# Patient Record
Sex: FEMALE | Race: WHITE | NOT HISPANIC OR LATINO
[De-identification: names, ages, dates, MRNs, and addresses within clinical notes are randomized per-mention and may not be internally consistent; named-entity substitution may affect disease eponyms.]

---

## 2017-07-20 ENCOUNTER — APPOINTMENT (OUTPATIENT)
Dept: HUMAN REPRODUCTION | Facility: CLINIC | Age: 22
End: 2017-07-20

## 2017-07-20 DIAGNOSIS — E28.2 POLYCYSTIC OVARIAN SYNDROME: ICD-10-CM

## 2017-07-22 RX ORDER — METFORMIN ER 500 MG 500 MG/1
500 TABLET ORAL
Qty: 90 | Refills: 6 | Status: ACTIVE | COMMUNITY
Start: 2017-07-20 | End: 1900-01-01

## 2017-11-24 ENCOUNTER — RX RENEWAL (OUTPATIENT)
Age: 22
End: 2017-11-24

## 2017-12-04 ENCOUNTER — RX RENEWAL (OUTPATIENT)
Age: 22
End: 2017-12-04

## 2017-12-05 ENCOUNTER — RX RENEWAL (OUTPATIENT)
Age: 22
End: 2017-12-05

## 2017-12-05 RX ORDER — METFORMIN ER 500 MG 500 MG/1
500 TABLET ORAL DAILY
Qty: 90 | Refills: 2 | Status: ACTIVE | COMMUNITY
Start: 2017-12-05 | End: 1900-01-01

## 2018-03-08 ENCOUNTER — EMERGENCY (EMERGENCY)
Facility: HOSPITAL | Age: 23
LOS: 1 days | Discharge: HOME | End: 2018-03-08
Attending: EMERGENCY MEDICINE | Admitting: EMERGENCY MEDICINE
Payer: COMMERCIAL

## 2018-03-08 VITALS
WEIGHT: 166.89 LBS | HEART RATE: 110 BPM | SYSTOLIC BLOOD PRESSURE: 136 MMHG | HEIGHT: 65 IN | TEMPERATURE: 98 F | DIASTOLIC BLOOD PRESSURE: 93 MMHG | RESPIRATION RATE: 18 BRPM | OXYGEN SATURATION: 99 %

## 2018-03-08 VITALS
TEMPERATURE: 98 F | SYSTOLIC BLOOD PRESSURE: 126 MMHG | HEART RATE: 88 BPM | DIASTOLIC BLOOD PRESSURE: 79 MMHG | OXYGEN SATURATION: 98 % | RESPIRATION RATE: 18 BRPM

## 2018-03-08 LAB
ALBUMIN SERPL ELPH-MCNC: 4.3 G/DL — SIGNIFICANT CHANGE UP (ref 3.3–5.2)
ALP SERPL-CCNC: 31 U/L — LOW (ref 40–120)
ALT FLD-CCNC: 15 U/L — SIGNIFICANT CHANGE UP
ANION GAP SERPL CALC-SCNC: 15 MMOL/L — SIGNIFICANT CHANGE UP (ref 5–17)
APPEARANCE UR: CLEAR — SIGNIFICANT CHANGE UP
AST SERPL-CCNC: 16 U/L — SIGNIFICANT CHANGE UP
BASOPHILS # BLD AUTO: 0 K/UL — SIGNIFICANT CHANGE UP (ref 0–0.2)
BILIRUB SERPL-MCNC: 0.2 MG/DL — LOW (ref 0.4–2)
BILIRUB UR-MCNC: NEGATIVE — SIGNIFICANT CHANGE UP
BUN SERPL-MCNC: 4 MG/DL — LOW (ref 8–20)
CALCIUM SERPL-MCNC: 9.1 MG/DL — SIGNIFICANT CHANGE UP (ref 8.6–10.2)
CHLORIDE SERPL-SCNC: 100 MMOL/L — SIGNIFICANT CHANGE UP (ref 98–107)
CO2 SERPL-SCNC: 23 MMOL/L — SIGNIFICANT CHANGE UP (ref 22–29)
COLOR SPEC: YELLOW — SIGNIFICANT CHANGE UP
CREAT SERPL-MCNC: 0.64 MG/DL — SIGNIFICANT CHANGE UP (ref 0.5–1.3)
D DIMER BLD IA.RAPID-MCNC: 437 NG/ML DDU — HIGH
DIFF PNL FLD: ABNORMAL
EOSINOPHIL # BLD AUTO: 0 K/UL — SIGNIFICANT CHANGE UP (ref 0–0.5)
EOSINOPHIL NFR BLD AUTO: 1 % — SIGNIFICANT CHANGE UP (ref 0–5)
EPI CELLS # UR: SIGNIFICANT CHANGE UP
FLUAV H1 2009 PAND RNA SPEC QL NAA+PROBE: DETECTED
GLUCOSE SERPL-MCNC: 103 MG/DL — SIGNIFICANT CHANGE UP (ref 70–115)
GLUCOSE UR QL: NEGATIVE MG/DL — SIGNIFICANT CHANGE UP
HCG UR QL: NEGATIVE — SIGNIFICANT CHANGE UP
HCT VFR BLD CALC: 41.5 % — SIGNIFICANT CHANGE UP (ref 37–47)
HGB BLD-MCNC: 13.7 G/DL — SIGNIFICANT CHANGE UP (ref 12–16)
KETONES UR-MCNC: NEGATIVE — SIGNIFICANT CHANGE UP
LEUKOCYTE ESTERASE UR-ACNC: NEGATIVE — SIGNIFICANT CHANGE UP
LYMPHOCYTES # BLD AUTO: 0.9 K/UL — LOW (ref 1–4.8)
LYMPHOCYTES # BLD AUTO: 20 % — SIGNIFICANT CHANGE UP (ref 20–55)
MCHC RBC-ENTMCNC: 28.9 PG — SIGNIFICANT CHANGE UP (ref 27–31)
MCHC RBC-ENTMCNC: 33 G/DL — SIGNIFICANT CHANGE UP (ref 32–36)
MCV RBC AUTO: 87.6 FL — SIGNIFICANT CHANGE UP (ref 81–99)
MONOCYTES # BLD AUTO: 0.8 K/UL — SIGNIFICANT CHANGE UP (ref 0–0.8)
MONOCYTES NFR BLD AUTO: 19 % — HIGH (ref 3–10)
NEUTROPHILS # BLD AUTO: 2.3 K/UL — SIGNIFICANT CHANGE UP (ref 1.8–8)
NEUTROPHILS NFR BLD AUTO: 59 % — SIGNIFICANT CHANGE UP (ref 37–73)
NITRITE UR-MCNC: NEGATIVE — SIGNIFICANT CHANGE UP
PH UR: 6 — SIGNIFICANT CHANGE UP (ref 5–8)
PLAT MORPH BLD: NORMAL — SIGNIFICANT CHANGE UP
PLATELET # BLD AUTO: 187 K/UL — SIGNIFICANT CHANGE UP (ref 150–400)
POTASSIUM SERPL-MCNC: 3.7 MMOL/L — SIGNIFICANT CHANGE UP (ref 3.5–5.3)
POTASSIUM SERPL-SCNC: 3.7 MMOL/L — SIGNIFICANT CHANGE UP (ref 3.5–5.3)
PROT SERPL-MCNC: 7.9 G/DL — SIGNIFICANT CHANGE UP (ref 6.6–8.7)
PROT UR-MCNC: 15 MG/DL
RAPID RVP RESULT: DETECTED
RBC # BLD: 4.74 M/UL — SIGNIFICANT CHANGE UP (ref 4.4–5.2)
RBC # FLD: 13.1 % — SIGNIFICANT CHANGE UP (ref 11–15.6)
RBC BLD AUTO: NORMAL — SIGNIFICANT CHANGE UP
RBC CASTS # UR COMP ASSIST: ABNORMAL /HPF (ref 0–4)
SODIUM SERPL-SCNC: 138 MMOL/L — SIGNIFICANT CHANGE UP (ref 135–145)
SP GR SPEC: 1.01 — SIGNIFICANT CHANGE UP (ref 1.01–1.02)
UROBILINOGEN FLD QL: NEGATIVE MG/DL — SIGNIFICANT CHANGE UP
VARIANT LYMPHS # BLD: 1 % — SIGNIFICANT CHANGE UP (ref 0–6)
WBC # BLD: 4 K/UL — LOW (ref 4.8–10.8)
WBC # FLD AUTO: 4 K/UL — LOW (ref 4.8–10.8)
WBC UR QL: SIGNIFICANT CHANGE UP

## 2018-03-08 PROCEDURE — 85027 COMPLETE CBC AUTOMATED: CPT

## 2018-03-08 PROCEDURE — 85379 FIBRIN DEGRADATION QUANT: CPT

## 2018-03-08 PROCEDURE — 36415 COLL VENOUS BLD VENIPUNCTURE: CPT

## 2018-03-08 PROCEDURE — 71275 CT ANGIOGRAPHY CHEST: CPT | Mod: 26

## 2018-03-08 PROCEDURE — 96374 THER/PROPH/DIAG INJ IV PUSH: CPT | Mod: XU

## 2018-03-08 PROCEDURE — 96375 TX/PRO/DX INJ NEW DRUG ADDON: CPT

## 2018-03-08 PROCEDURE — 71046 X-RAY EXAM CHEST 2 VIEWS: CPT | Mod: 26

## 2018-03-08 PROCEDURE — 80053 COMPREHEN METABOLIC PANEL: CPT

## 2018-03-08 PROCEDURE — 71046 X-RAY EXAM CHEST 2 VIEWS: CPT

## 2018-03-08 PROCEDURE — 87486 CHLMYD PNEUM DNA AMP PROBE: CPT

## 2018-03-08 PROCEDURE — 81025 URINE PREGNANCY TEST: CPT

## 2018-03-08 PROCEDURE — 99284 EMERGENCY DEPT VISIT MOD MDM: CPT | Mod: 25

## 2018-03-08 PROCEDURE — 87633 RESP VIRUS 12-25 TARGETS: CPT

## 2018-03-08 PROCEDURE — 99284 EMERGENCY DEPT VISIT MOD MDM: CPT

## 2018-03-08 PROCEDURE — 71275 CT ANGIOGRAPHY CHEST: CPT

## 2018-03-08 PROCEDURE — 87581 M.PNEUMON DNA AMP PROBE: CPT

## 2018-03-08 PROCEDURE — 93005 ELECTROCARDIOGRAM TRACING: CPT

## 2018-03-08 PROCEDURE — 87798 DETECT AGENT NOS DNA AMP: CPT

## 2018-03-08 PROCEDURE — 81001 URINALYSIS AUTO W/SCOPE: CPT

## 2018-03-08 PROCEDURE — 93010 ELECTROCARDIOGRAM REPORT: CPT

## 2018-03-08 RX ORDER — DIPHENHYDRAMINE HCL 50 MG
50 CAPSULE ORAL ONCE
Qty: 0 | Refills: 0 | Status: COMPLETED | OUTPATIENT
Start: 2018-03-08 | End: 2018-03-08

## 2018-03-08 RX ORDER — ACETAMINOPHEN 500 MG
975 TABLET ORAL ONCE
Qty: 0 | Refills: 0 | Status: COMPLETED | OUTPATIENT
Start: 2018-03-08 | End: 2018-03-08

## 2018-03-08 RX ORDER — SODIUM CHLORIDE 9 MG/ML
1000 INJECTION INTRAMUSCULAR; INTRAVENOUS; SUBCUTANEOUS
Qty: 0 | Refills: 0 | Status: DISCONTINUED | OUTPATIENT
Start: 2018-03-08 | End: 2018-03-12

## 2018-03-08 RX ORDER — SODIUM CHLORIDE 9 MG/ML
1000 INJECTION INTRAMUSCULAR; INTRAVENOUS; SUBCUTANEOUS ONCE
Qty: 0 | Refills: 0 | Status: COMPLETED | OUTPATIENT
Start: 2018-03-08 | End: 2018-03-08

## 2018-03-08 RX ORDER — KETOROLAC TROMETHAMINE 30 MG/ML
30 SYRINGE (ML) INJECTION ONCE
Qty: 0 | Refills: 0 | Status: DISCONTINUED | OUTPATIENT
Start: 2018-03-08 | End: 2018-03-08

## 2018-03-08 RX ADMIN — Medication 50 MILLIGRAM(S): at 15:49

## 2018-03-08 RX ADMIN — Medication 30 MILLIGRAM(S): at 11:35

## 2018-03-08 RX ADMIN — Medication 125 MILLIGRAM(S): at 13:21

## 2018-03-08 RX ADMIN — SODIUM CHLORIDE 1000 MILLILITER(S): 9 INJECTION INTRAMUSCULAR; INTRAVENOUS; SUBCUTANEOUS at 11:22

## 2018-03-08 RX ADMIN — Medication 30 MILLIGRAM(S): at 11:22

## 2018-03-08 RX ADMIN — Medication 975 MILLIGRAM(S): at 15:49

## 2018-03-08 NOTE — ED STATDOCS - MEDICAL DECISION MAKING DETAILS
URI symptoms, suspicious for virus/flu, tachycardic, seen by PMD/cardiologist for same. CXR for PNA, swap to confirm flu, hydrate aggressively, treat symptomatically, check results and re-asses.  D-dimer to screen for PE.

## 2018-03-08 NOTE — ED STATDOCS - OBJECTIVE STATEMENT
23 y/o F With a Hx of PCOS, alopecia, anxiety presents to the ED c/o flu like symptoms since 4 days ago. Pt woke up yesterday with a worsening symptoms. She had a fever, body aches, HA, productive cough, eye ball pain, SOB on exertion, and sore throat. Pt went to her PCP (Dr. Alvarez) - HR was 130 and did an EKG where the doctor saw a change. Pt went to the cardiologist after said it has changed but not to worry about it and to come back in 1 month for a follow up. Pt works at [employer] and is in contact with sick patients. Did not get a flu shot this year. Pt has a hx of Hypoplastic right vertebro. Allergic to sulur. Patient is on Metformin, Spirolactone , and xanax. Patient is on birth control pills. Denies CP. No other complaints at this time.

## 2018-03-08 NOTE — ED ADULT NURSE NOTE - OBJECTIVE STATEMENT
pt presents to eD with cough, congestion, fever and SOB on exertion. breathing is even and unlabored. denies n/v/d. a&ox3. will continuen to monitor and reassess . NAD

## 2018-03-08 NOTE — ED ADULT TRIAGE NOTE - CHIEF COMPLAINT QUOTE
Pt c/o cough X 4 days, went to PCP and was told to come to ED because heart was racing.  Pt also went to cardiologist and was told EKG was abnormal.

## 2018-12-17 NOTE — ED ADULT NURSE NOTE - CAS EDN INTEG ASSESS
12/17/2018       RE: Sabra Mtz  [address] [facility]  [address]  [place] [address]     Dear Colleague,    Thank you for referring your patient, Sabra Mtz, to the [facility] DERMATOLOGY at [facility]. Please see a copy of my visit note below.    [facility] Dermatology Note      Dermatology Problem List:  1. Acne vulgaris: refractory to topicals, OCPs, and doxycycline, with scarring. Starting isotretinoin 40 mg daily (12/17/18).   - iPLEDGE # [id number]  - Cumulative dose: 0 mg  - Goal dose (120-150 mg/kg): 7028-8444 mg  - birth control: OCPs and condoms    Encounter Date: Dec 17, 2018    CC:   Chief Complaint   Patient presents with     Derm Problem     Sabra is here for a followup regarding acne and medications.          History of Present Illness:  Ms. Sabra Mtz is a 20 year old woman w/ PMHx of acne vulgaris refractory to OCPs, topical retinoids, topical clindamycin, and oral doxycycline who presents for initiation of systemic isotretinoin. She has been following in derm clinic and completed her iPLEDGE paperwork. She's had 2 negative pregnancy tests and is using OCPs and condoms for contraception.     Her acne primarily involves her back and to some extent, her chest, with blackheads, whiteheads, deeper red inflammatory papules, and scarring. Has may improved a little bit w/ OCPs but continues to be present and flare. Did not improve w/ prior therapies, such as topical retinoids and both topical/systemic antibiotics. PO isotretinoin worked well for her sister. She is enthusiastic to try this.     No other skin concerns at this time. No rashes or other skin lesions.     Past Medical History:   - acne vulgaris   - labial abscess s/p I&D    History reviewed. No pertinent surgical history.    Social History:   reports that  has never smoked. she has never used smokeless tobacco.    Family History:  History reviewed. No pertinent family  history.    Medications:  Current Outpatient Medications   Medication Sig Dispense Refill     adapalene (DIFFERIN) 0.1 % gel Apply topically At Bedtime 15 g 3     benzoyl peroxide 5 % LIQD Use daily as directed 226 g 3     clindamycin (CLEOCIN T) 1 % lotion Apply topically every morning 60 mL 3     norgestim-eth estrad triphasic (ORTHO TRI-CYCLEN LO) 0.18/0.215/0.25 MG-25 MCG per tablet Take 1 tablet by mouth daily 28 tablet 3        No Known Allergies      Review of Systems:  -As per HPI  -Constitutional: The patient denies fatigue, fevers, chills, unintended weight loss, lymphadenopathy, and night sweats. No recent illnesses   -Skin: As above in HPI. No additional skin concerns.    Physical exam:  Vitals: There were no vitals taken for this visit.  GEN: This is a well developed, well-nourished female in no acute distress, in a pleasant mood.    SKIN: Sun-exposed skin, which includes the head/face, neck, both arms, digits, and/or nails was examined.   - Scattered erythematous papulopustules and admixed comedones on the face, back, and to a lesser extent, the chest, with scattered atrophic pink scars in this distribution  - no concerning lesions on the arms and hands       Impression/Plan:  1. Acne vulgaris: Involves primarily the face, back, and chest. Has minimally improved w/ OCPs. Did not respond to other therapies. Completed iPLEDGE paperwork, on OCPs, and has had 2 neg pregnancy test. Liver testing and TG/lipids in essentially normal ranges. Pt approximately 55 kg. Will start isotretinoin 40mg daily. Consider increasing to 60mg daily pending response in 30 days. Goal 120-150 mg/kg.    Start isotretinoin PO 40mg daily    Given 1 mo supply, educated re: side effects     Refilled OCP Rx    Continue OCP and condom use    Stop other acne treatments    RTC in 30 days w/ CMP, lipid panel, urine pregnancy test      Pt's iPLEDGE # is [id number].    CC Dr. Davila on close of this encounter.  Follow-up in 4 weeks.        Dr. Davila staffed the patient.    Staff Involved:  Resident(Joel Maguire)/Staff(as above)    Staff Physician Comments:   I saw and evaluated the patient with the resident and I edited the assessment and plan as documented in the note. I was present for the examination.    Delfin Davila MD   of Dermatology  Department of Dermatology  [facility]              Again, thank you for allowing me to participate in the care of your patient.      Sincerely,    Delfin Davila MD       [facility]

## 2019-02-05 ENCOUNTER — APPOINTMENT (OUTPATIENT)
Dept: DERMATOLOGY | Facility: CLINIC | Age: 24
End: 2019-02-05

## 2019-12-03 ENCOUNTER — TRANSCRIPTION ENCOUNTER (OUTPATIENT)
Age: 24
End: 2019-12-03

## 2019-12-07 ENCOUNTER — EMERGENCY (EMERGENCY)
Facility: HOSPITAL | Age: 24
LOS: 1 days | Discharge: HOME | End: 2019-12-07
Attending: EMERGENCY MEDICINE
Payer: MEDICAID

## 2019-12-07 VITALS
WEIGHT: 190.04 LBS | HEART RATE: 86 BPM | RESPIRATION RATE: 18 BRPM | TEMPERATURE: 98 F | SYSTOLIC BLOOD PRESSURE: 133 MMHG | HEIGHT: 65 IN | OXYGEN SATURATION: 100 % | DIASTOLIC BLOOD PRESSURE: 85 MMHG

## 2019-12-07 LAB
APPEARANCE UR: CLEAR — SIGNIFICANT CHANGE UP
BILIRUB UR-MCNC: NEGATIVE — SIGNIFICANT CHANGE UP
COLOR SPEC: YELLOW — SIGNIFICANT CHANGE UP
DIFF PNL FLD: ABNORMAL
GLUCOSE UR QL: NEGATIVE MG/DL — SIGNIFICANT CHANGE UP
KETONES UR-MCNC: NEGATIVE — SIGNIFICANT CHANGE UP
LEUKOCYTE ESTERASE UR-ACNC: NEGATIVE — SIGNIFICANT CHANGE UP
NITRITE UR-MCNC: NEGATIVE — SIGNIFICANT CHANGE UP
PH UR: 6.5 — SIGNIFICANT CHANGE UP (ref 5–8)
PROT UR-MCNC: NEGATIVE MG/DL — SIGNIFICANT CHANGE UP
SP GR SPEC: 1.01 — SIGNIFICANT CHANGE UP (ref 1.01–1.02)
UROBILINOGEN FLD QL: NEGATIVE MG/DL — SIGNIFICANT CHANGE UP

## 2019-12-07 PROCEDURE — 99284 EMERGENCY DEPT VISIT MOD MDM: CPT

## 2019-12-07 RX ORDER — IPRATROPIUM/ALBUTEROL SULFATE 18-103MCG
3 AEROSOL WITH ADAPTER (GRAM) INHALATION ONCE
Refills: 0 | Status: COMPLETED | OUTPATIENT
Start: 2019-12-07 | End: 2019-12-07

## 2019-12-07 NOTE — ED STATDOCS - ATTENDING CONTRIBUTION TO CARE
I, Raymundo Powers, performed the initial face to face bedside interview with this patient regarding history of present illness, review of symptoms and relevant past medical, social and family history.  I completed an independent physical examination.  I was the initial provider who evaluated this patient. I have signed out the follow up of any pending tests (i.e. labs, radiological studies) to the ACP.  I have communicated the patient’s plan of care and disposition with the ACP.

## 2019-12-07 NOTE — ED STATDOCS - PATIENT PORTAL LINK FT
You can access the FollowMyHealth Patient Portal offered by [facility] by registering at the following website: [email or web address]. By joining [facility]’s FollowMyHealth portal, you will also be able to view your health information using other applications (apps) compatible with our system.

## 2019-12-07 NOTE — ED STATDOCS - OBJECTIVE STATEMENT
23y/o F with PMHx of Factor V, PCOS, Asthma and Hypoplastic right artery (Plavix) presents to the ED c/o tingling and aching to b/l LE, onset today. Assoc. sx of cold feet. She reports that she took Albuterol a few hrs PTA due to heavy cough. Pt reported to [facility] for sx and was referred to f/u at ED for possible DVT. Pt reports that she was seen at [facility] 1 week ago for congestion and dry cough, had neb treatment and Albuterol, was dx with a sinus infection and was started on Doxycycline 4 days ago. Pt reports that factor V was found through blood work and hypoplastic right artery was dx by an MRI that she had performed due to chronic migraines. Denies recent injury or trauma. Denies fever, LE swelling, LE discoloration, nausea or vomiting. No additional complaints at this time.

## 2019-12-07 NOTE — ED ADULT TRIAGE NOTE - CHIEF COMPLAINT QUOTE
patient states that she was seen at urgent care on tuesday for sinus and on ABX, went back to other urgent care and was sen to ER for bilateral tingling and achy to legs, left > right, and now a cough, on blood thinners HX of factor V

## 2019-12-07 NOTE — ED STATDOCS - PROGRESS NOTE DETAILS
ESPERANZA DENNIS: PT evaluated by intake physician. HPI/PE/ROS as noted above. Will follow up plan per intake physician   pending US

## 2019-12-08 VITALS
DIASTOLIC BLOOD PRESSURE: 84 MMHG | TEMPERATURE: 99 F | OXYGEN SATURATION: 100 % | HEART RATE: 70 BPM | SYSTOLIC BLOOD PRESSURE: 124 MMHG | RESPIRATION RATE: 16 BRPM

## 2019-12-08 LAB
BACTERIA # UR AUTO: ABNORMAL
EPI CELLS # UR: SIGNIFICANT CHANGE UP
HCG UR QL: NEGATIVE — SIGNIFICANT CHANGE UP
RBC CASTS # UR COMP ASSIST: ABNORMAL /HPF (ref 0–4)
WBC UR QL: SIGNIFICANT CHANGE UP

## 2019-12-08 PROCEDURE — 93970 EXTREMITY STUDY: CPT | Mod: 26

## 2019-12-08 PROCEDURE — 81001 URINALYSIS AUTO W/SCOPE: CPT

## 2019-12-08 PROCEDURE — 99283 EMERGENCY DEPT VISIT LOW MDM: CPT | Mod: 25

## 2019-12-08 PROCEDURE — 81025 URINE PREGNANCY TEST: CPT

## 2019-12-08 PROCEDURE — 93970 EXTREMITY STUDY: CPT

## 2019-12-08 PROCEDURE — 94640 AIRWAY INHALATION TREATMENT: CPT

## 2019-12-08 RX ADMIN — Medication 3 MILLILITER(S): at 00:04

## 2019-12-08 NOTE — ED ADULT NURSE NOTE - OBJECTIVE STATEMENT
Pt in no apparent distress at this time. Airway patent, breathing spontaneous and nonlabored. Pt A&Ox3, resting in stretcher. Pt c/o sinus congestion and tingling to legs. Pt recently seen at urgent care but states "it got worse." pt denies any chest pain or difficulty breathing.

## 2019-12-08 NOTE — ED ADULT NURSE REASSESSMENT NOTE - NS ED NURSE REASSESS COMMENT FT1
Pt in no apparent distress at this time. Resting comfortably in chair. RN transported pt to Ultrasound without complications.

## 2020-01-21 ENCOUNTER — TRANSCRIPTION ENCOUNTER (OUTPATIENT)
Age: 25
End: 2020-01-21

## 2020-02-19 NOTE — ED CLERICAL - NS ED CLERK PCP INFORMATION
HISTORY OF PRESENT ILLNESS  Mansi Kaufman is a 25 y.o. female. Continues to have issues with her  who she thinks is being unfaithful. She confronted him with it, they go to counseling. Unclear if they are making progress. Not having any sort of vaginal discharge. Still has relationships with him. Dysuria   The history is provided by the patient. This is a new problem. Episode onset: 1 mo. The problem occurs daily. The problem has been gradually worsening (x a week). Associated symptoms include abdominal pain. Treatments tried: UC rx bactrim not finished due to rxn. The treatment provided moderate (Sx have returned) relief. Been on Macrobid before without problems. He continues to be somewhat depressed. Still takes care of her 3 children. Current Outpatient Medications   Medication Sig Dispense Refill    citalopram (CELEXA) 20 mg tablet Take 1 Tab by mouth daily. 90 Tab 1    albuterol (PROVENTIL HFA, VENTOLIN HFA, PROAIR HFA) 90 mcg/actuation inhaler Take 1 Puff by inhalation every six (6) hours as needed for Wheezing. Indications: Asthma Attack 1 Inhaler 2    norgestimate-ethinyl estradiol (ORTHO-CYCLEN, SPRINTEC) 0.25-35 mg-mcg tab Take 1 Tab by mouth daily. 1 Package 11     Allergies   Allergen Reactions    Amoxicillin Rash     Rash, itching    Bactrim [Sulfamethoprim] Rash    Cefzil [Cefprozil] Rash     Rash, itching    Pcn [Penicillins] Rash     Rash, itching       Review of Systems   Constitutional: Negative for fever. Respiratory: Negative for wheezing. Gastrointestinal: Positive for abdominal pain. Genitourinary: Positive for difficulty urinating, dysuria and urgency. Psychiatric/Behavioral: Positive for depression. Negative for suicidal ideas.      Social History     Socioeconomic History    Marital status: UNKNOWN     Spouse name: Not on file    Number of children: Not on file    Years of education: Not on file    Highest education level: Not on file Yes Occupational History    Not on file   Social Needs    Financial resource strain: Not on file    Food insecurity:     Worry: Not on file     Inability: Not on file    Transportation needs:     Medical: Not on file     Non-medical: Not on file   Tobacco Use    Smoking status: Former Smoker    Smokeless tobacco: Never Used   Substance and Sexual Activity    Alcohol use: Not Currently    Drug use: Never    Sexual activity: Yes     Partners: Male   Lifestyle    Physical activity:     Days per week: Not on file     Minutes per session: Not on file    Stress: Not on file   Relationships    Social connections:     Talks on phone: Not on file     Gets together: Not on file     Attends [employer] service: Not on file     Active member of club or organization: Not on file     Attends meetings of clubs or organizations: Not on file     Relationship status: Not on file    Intimate partner violence:     Fear of current or ex partner: Not on file     Emotionally abused: Not on file     Physically abused: Not on file     Forced sexual activity: Not on file   Other Topics Concern    Not on file   Social History Narrative     is having relationships with other women. Physical Exam  Visit Vitals  /75 (BP 1 Location: Left arm, BP Patient Position: Sitting)   Pulse 82   Temp 98.1 °F (36.7 °C) (Temporal)   Resp 16   Ht 5' 4.75\" (1.645 m)   Wt 105 lb (47.6 kg)   SpO2 99%   BMI 17.61 kg/m²     WD WN female NAD  Heart RRR without murmers clicks or rubs  Lungs CTA  Abdo soft nontender  Ext no edema    ASSESSMENT and PLAN  Encounter Diagnoses   Name Primary?     Dysuria Yes    Urinary tract infection without hematuria, site unspecified     Current mild episode of major depressive disorder, unspecified whether recurrent (HCC)     Mild intermittent asthma without complication      Orders Placed This Encounter    CULTURE, URINE    AMB POC URINALYSIS DIP STICK AUTO W/ MICRO    nitrofurantoin, macrocrystal-monohydrate, (MACROBID) 100 mg capsule   UTI treatment as above  Allergy to Bactrim and sulfa, will avoid  Depression continue medication and counseling. Asthma stable. Follow-up and Dispositions    · Return in about 2 weeks (around 3/4/2020) for routine follow up.

## 2020-07-28 NOTE — ED STATDOCS - CARE PLAN
DILTIAZEM 2% PLO   Last Written Prescription Date:  4/28/2020  Last Fill Quantity: 30,   # refills: 3  Last Office Visit : 4/8/2020  Future Office visit:  None    Routing refill request to provider for review/approval because:  Drug not on the [facility], P or [facility] refill protocol or controlled substance    Flora Wilkerson RN  Central Triage Red Flags/Med Refills         Principal Discharge DX:	Influenza Principal Discharge DX:	Influenza  Secondary Diagnosis:	Tachycardia

## 2021-01-26 VITALS
DIASTOLIC BLOOD PRESSURE: 50 MMHG | RESPIRATION RATE: 16 BRPM | TEMPERATURE: 97.3 F | SYSTOLIC BLOOD PRESSURE: 102 MMHG | HEIGHT: 63 IN | HEART RATE: 73 BPM | WEIGHT: 159.4 LBS | OXYGEN SATURATION: 99 % | BODY MASS INDEX: 28.24 KG/M2

## 2023-06-29 ENCOUNTER — NON-APPOINTMENT (OUTPATIENT)
Age: 28
End: 2023-06-29

## 2023-06-29 DIAGNOSIS — D68.51 ACTIVATED PROTEIN C RESISTANCE: ICD-10-CM

## 2023-06-29 DIAGNOSIS — Z87.09 PERSONAL HISTORY OF OTHER DISEASES OF THE RESPIRATORY SYSTEM: ICD-10-CM

## 2023-06-29 DIAGNOSIS — Z91.013 ALLERGY TO SEAFOOD: ICD-10-CM

## 2025-06-23 NOTE — ED STATDOCS - NS ED MD DISPO DISCHARGE
Chief Complaint   Patient presents with    Multiple Sclerosis       Patient ID: Kylie Long is a 74 y.o. female.    HPI:  I have had the pleasure of seeing your patient today.  As you may know she is a 74-year-old female here for the evaluation and treatment of multiple sclerosis.  She was diagnosed back in 2003.  However she feels that she started having symptoms dating back to the age of 19.  She says that she lost vision in her right eye.  That resolved spontaneously.  Eventually after other symptoms being reported to her physicians she was sent for evaluation.  She started Rebif after being diagnosed.  However that was discontinued due to side effects.  She was then started on Tysabri in 2010.  She has been receiving Tysabri infusions since then.  She does have a history of lumbar and cervical stenosis and has had prior intervention at both locations.  Follow-up MRI imaging has been favorable with respect to lesion burden.  Her last MRI of the brain was performed in September of last year.  This did again show stable lesion burden.  It does show progression of cervical stenosis however.  Her most recent JCV antibody titer was performed in March and was negative.  She has no new onset of focal weakness or numbness.  She has missed her last Tysabri infusion.  She states that since discontinuing the Tysabri she has noted some tingling sensations in her hands and feet.  Again no focal symptoms.    The following portions of the patient's history were reviewed and updated as appropriate: allergies, current medications, past family history, past medical history, past social history, past surgical history and problem list.    Review of Systems   I have reviewed the review of systems above performed by my medical assistant.      Vitals:    06/23/25 1143   BP: 154/74   Pulse: 62   SpO2: 99%       Neurological Exam  Mental Status  Awake, alert and oriented to person, place and time. Recent and remote memory are intact.  Speech is normal. Language is fluent with no aphasia. Attention and concentration are normal. Fund of knowledge is appropriate for level of education.    Cranial Nerves  CN I: Sense of smell is normal.  CN II: Visual acuity is normal.  CN III, IV, VI: Extraocular movements intact bilaterally. Pupils equal round and reactive to light bilaterally.  CN V: Facial sensation is normal.  CN VII: Full and symmetric facial movement.  CN XI: Shoulder shrug strength is normal.  CN XII: Tongue midline without atrophy or fasciculations.    Motor  Normal muscle bulk throughout. No fasciculations present. Normal muscle tone. No abnormal involuntary movements. No pronator drift.                                             Right                     Left  Rhomboids                            5                          5  Infraspinatus                          5                          5  Supraspinatus                       5                          5  Deltoid                                   5                          5   Biceps                                   5                          5  Brachioradialis                      5                          5   Triceps                                  5                          5   Pronator                                5                          5   Supinator                              5                           5   Wrist flexor                            5                          5   Wrist extensor                       5                          5   Finger flexor                          5                          5   Finger extensor                     5                          5   Interossei                              5                          5   Abductor pollicis brevis         5                          5   Flexor pollicis brevis             5                          5   Opponens pollicis                 5                          5  Extensor digitorum                5                          5  Abductor digiti minimi           5                          5   Abdominal                            5                          5  Glutei                                    5                          5  Hip abductor                         5                          5  Hip adductor                         5                          5   Iliopsoas                               5                          5   Quadriceps                           5                          5   Hamstring                             5                          5   Gastrocnemius                     5                           5   Anterior tibialis                      5                          5   Posterior tibialis                    5                          5   Peroneal                               5                          5  Ankle dorsiflexor                   5                          5  Ankle plantar flexor              5                           5  Extensor hallucis longus      5                           5    Sensory  Sensation is intact to light touch, pinprick, vibration and proprioception in all four extremities.    Reflexes  Deep tendon reflexes are 2+ and symmetric in all four extremities.    Right pathological reflexes: Kane's absent.  Left pathological reflexes: Kane's absent.    Coordination    Finger-to-nose, rapid alternating movements and heel-to-shin normal bilaterally without dysmetria.    Gait  Normal casual, toe, heel and tandem gait.       Physical Exam  Vitals reviewed.   Constitutional:       General: She is not in acute distress.     Appearance: She is well-developed.   HENT:      Head: Normocephalic and atraumatic.   Eyes:      Extraocular Movements: Extraocular movements intact.      Pupils: Pupils are equal, round, and reactive to light.   Cardiovascular:      Rate and Rhythm: Normal rate and regular rhythm.      Heart sounds: Normal heart sounds.   Pulmonary:       Effort: Pulmonary effort is normal. No respiratory distress.      Breath sounds: Normal breath sounds.   Abdominal:      General: Bowel sounds are normal. There is no distension.      Palpations: Abdomen is soft.      Tenderness: There is no abdominal tenderness.   Musculoskeletal:         General: No deformity.      Cervical back: Normal range of motion.   Skin:     General: Skin is warm.      Findings: No rash.   Neurological:      Coordination: Coordination is intact.      Deep Tendon Reflexes: Reflexes are normal and symmetric.   Psychiatric:         Speech: Speech normal.         Judgment: Judgment normal.         Procedures    Assessment/Plan: She has no focal signs or symptoms today.  I do feel it would be prudent to continue the Tysabri infusions for now.  We will check a JCV antibody titer along with a CBC with differential today.  MRI of the brain both with and without contrast.  Return to clinic in 4 months or sooner if needed.  A total of 60 minutes was spent face-to-face with the patient today.  Of that greater than 50% of this time was spent discussing signs and symptoms of MS, patient education, plan of care and prognosis.         Diagnoses and all orders for this visit:    1. Multiple sclerosis (Primary)  -     Stratify JCV, Antibody ANGEL With / Reflex To Inhibition Assay ([facility]); Future  -     CBC & Differential  -     MRI Brain With & Without Contrast; Future           Sebastian Payton II, MD           Home